# Patient Record
Sex: FEMALE | Race: WHITE | NOT HISPANIC OR LATINO | Employment: FULL TIME | URBAN - METROPOLITAN AREA
[De-identification: names, ages, dates, MRNs, and addresses within clinical notes are randomized per-mention and may not be internally consistent; named-entity substitution may affect disease eponyms.]

---

## 2022-06-08 ENCOUNTER — HOSPITAL ENCOUNTER (EMERGENCY)
Facility: HOSPITAL | Age: 82
Discharge: HOME | End: 2022-06-08
Attending: EMERGENCY MEDICINE
Payer: MEDICARE

## 2022-06-08 VITALS
DIASTOLIC BLOOD PRESSURE: 87 MMHG | BODY MASS INDEX: 23.56 KG/M2 | HEIGHT: 64 IN | HEART RATE: 69 BPM | TEMPERATURE: 98 F | WEIGHT: 138 LBS | RESPIRATION RATE: 18 BRPM | SYSTOLIC BLOOD PRESSURE: 135 MMHG | OXYGEN SATURATION: 97 %

## 2022-06-08 DIAGNOSIS — R91.1 RIGHT LOWER LOBE PULMONARY NODULE: ICD-10-CM

## 2022-06-08 DIAGNOSIS — S22.060A COMPRESSION FRACTURE OF T7 VERTEBRA, INITIAL ENCOUNTER: Primary | ICD-10-CM

## 2022-06-08 DIAGNOSIS — R06.00 DYSPNEA, UNSPECIFIED TYPE: ICD-10-CM

## 2022-06-08 LAB
ALBUMIN SERPL-MCNC: 3.6 GM/DL (ref 3.4–4.8)
ALBUMIN/GLOB SERPL: 1.3 RATIO (ref 1.1–2)
ALP SERPL-CCNC: 90 UNIT/L (ref 40–150)
ALT SERPL-CCNC: 16 UNIT/L (ref 0–55)
APPEARANCE UR: CLEAR
AST SERPL-CCNC: 20 UNIT/L (ref 5–34)
BACTERIA #/AREA URNS AUTO: ABNORMAL /HPF
BASOPHILS # BLD AUTO: 0.05 X10(3)/MCL (ref 0–0.2)
BASOPHILS NFR BLD AUTO: 0.8 %
BILIRUB UR QL STRIP.AUTO: NEGATIVE MG/DL
BILIRUBIN DIRECT+TOT PNL SERPL-MCNC: 0.4 MG/DL
BNP BLD-MCNC: 14.9 PG/ML
BUN SERPL-MCNC: 20.6 MG/DL (ref 9.8–20.1)
CALCIUM SERPL-MCNC: 9.5 MG/DL (ref 8.4–10.2)
CHLORIDE SERPL-SCNC: 106 MMOL/L (ref 98–107)
CO2 SERPL-SCNC: 26 MMOL/L (ref 23–31)
COLOR UR AUTO: YELLOW
CREAT SERPL-MCNC: 0.88 MG/DL (ref 0.55–1.02)
D DIMER PPP IA.FEU-MCNC: 0.45 UG/ML FEU (ref 0–0.5)
EOSINOPHIL # BLD AUTO: 0.56 X10(3)/MCL (ref 0–0.9)
EOSINOPHIL NFR BLD AUTO: 9.2 %
ERYTHROCYTE [DISTWIDTH] IN BLOOD BY AUTOMATED COUNT: 13.9 % (ref 11.5–17)
GLOBULIN SER-MCNC: 2.8 GM/DL (ref 2.4–3.5)
GLUCOSE SERPL-MCNC: 121 MG/DL (ref 82–115)
GLUCOSE UR QL STRIP.AUTO: NEGATIVE MG/DL
HCT VFR BLD AUTO: 40.9 % (ref 37–47)
HGB BLD-MCNC: 13.5 GM/DL (ref 12–16)
IMM GRANULOCYTES # BLD AUTO: 0.03 X10(3)/MCL (ref 0–0.02)
IMM GRANULOCYTES NFR BLD AUTO: 0.5 % (ref 0–0.43)
KETONES UR QL STRIP.AUTO: NEGATIVE MG/DL
LEUKOCYTE ESTERASE UR QL STRIP.AUTO: ABNORMAL UNIT/L
LYMPHOCYTES # BLD AUTO: 1.74 X10(3)/MCL (ref 0.6–4.6)
LYMPHOCYTES NFR BLD AUTO: 28.6 %
MCH RBC QN AUTO: 30.9 PG (ref 27–31)
MCHC RBC AUTO-ENTMCNC: 33 MG/DL (ref 33–36)
MCV RBC AUTO: 93.6 FL (ref 80–94)
MONOCYTES # BLD AUTO: 0.41 X10(3)/MCL (ref 0.1–1.3)
MONOCYTES NFR BLD AUTO: 6.7 %
NEUTROPHILS # BLD AUTO: 3.3 X10(3)/MCL (ref 2.1–9.2)
NEUTROPHILS NFR BLD AUTO: 54.2 %
NITRITE UR QL STRIP.AUTO: NEGATIVE
NRBC BLD AUTO-RTO: 0 %
PH UR STRIP.AUTO: 5.5 [PH]
PLATELET # BLD AUTO: 293 X10(3)/MCL (ref 130–400)
PMV BLD AUTO: 9.6 FL (ref 9.4–12.4)
POTASSIUM SERPL-SCNC: 4 MMOL/L (ref 3.5–5.1)
PROT SERPL-MCNC: 6.4 GM/DL (ref 5.8–7.6)
PROT UR QL STRIP.AUTO: NEGATIVE MG/DL
RBC # BLD AUTO: 4.37 X10(6)/MCL (ref 4.2–5.4)
RBC #/AREA URNS AUTO: ABNORMAL /HPF
RBC UR QL AUTO: NEGATIVE UNIT/L
SODIUM SERPL-SCNC: 143 MMOL/L (ref 136–145)
SP GR UR STRIP.AUTO: 1.02
SQUAMOUS #/AREA URNS AUTO: ABNORMAL /LPF
TROPONIN I SERPL-MCNC: <0.01 NG/ML (ref 0–0.04)
UROBILINOGEN UR STRIP-ACNC: 0.2 MG/DL
WBC # SPEC AUTO: 6.1 X10(3)/MCL (ref 4.5–11.5)
WBC #/AREA URNS AUTO: ABNORMAL /HPF

## 2022-06-08 PROCEDURE — 81001 URINALYSIS AUTO W/SCOPE: CPT | Performed by: EMERGENCY MEDICINE

## 2022-06-08 PROCEDURE — 99285 EMERGENCY DEPT VISIT HI MDM: CPT | Mod: 25

## 2022-06-08 PROCEDURE — 85025 COMPLETE CBC W/AUTO DIFF WBC: CPT | Performed by: EMERGENCY MEDICINE

## 2022-06-08 PROCEDURE — 84075 ASSAY ALKALINE PHOSPHATASE: CPT | Performed by: EMERGENCY MEDICINE

## 2022-06-08 PROCEDURE — 85379 FIBRIN DEGRADATION QUANT: CPT | Performed by: EMERGENCY MEDICINE

## 2022-06-08 PROCEDURE — 80053 COMPREHEN METABOLIC PANEL: CPT | Performed by: EMERGENCY MEDICINE

## 2022-06-08 PROCEDURE — 36415 COLL VENOUS BLD VENIPUNCTURE: CPT | Performed by: EMERGENCY MEDICINE

## 2022-06-08 PROCEDURE — 84484 ASSAY OF TROPONIN QUANT: CPT | Performed by: EMERGENCY MEDICINE

## 2022-06-08 PROCEDURE — 83880 ASSAY OF NATRIURETIC PEPTIDE: CPT | Performed by: EMERGENCY MEDICINE

## 2022-06-08 RX ORDER — OLMESARTAN MEDOXOMIL AND HYDROCHLOROTHIAZIDE 20/12.5 20; 12.5 MG/1; MG/1
1 TABLET ORAL DAILY
COMMUNITY
Start: 2022-05-02

## 2022-06-08 RX ORDER — AMLODIPINE BESYLATE 5 MG/1
5 TABLET ORAL DAILY
COMMUNITY
Start: 2022-05-02

## 2022-06-08 RX ORDER — PRAVASTATIN SODIUM 40 MG/1
40 TABLET ORAL DAILY
COMMUNITY
Start: 2022-05-02

## 2022-06-08 NOTE — ED NOTES
Pt states she fell and hurt her  posterior mid to upper back and right shoulder one month ago, pt states she has a hx of left shoulder sx and is c/o right shoulder that has been hurting ever since her fall a month ago pt rates the pain as 5/10. Pt states her problems with sob started after her fall. Pt denies chest pain or dizziness, respirations 21 02sat 95% on RA

## 2022-06-08 NOTE — ED PROVIDER NOTES
Encounter Date: 6/8/2022       History     Chief Complaint   Patient presents with    Shortness of Breath     Pt states she fell a month ago, pt states she has had upper posterior back pain, and ever since she feels like she has been having problems breathing. Pt denies chest pain and dizziness.     81-year-old female states that she fell about a month ago and has been having upper back pain and shortness of breath that is getting progressively worse over the last month.  She had an x-ray at that time which was negative.  She saw her orthopedic doctor and had blood in her right shoulder joint which was aspirated and she was treated with a steroid shot there.  She has no chest pain.  She states she has a history HTN, HLD, of CAD, no stents or surgeries.  Over the last week she went on a motorcycle trip and traveled 1100 miles on a bike and the bouncing on the bite seen to have made her back pain and shortness of breath much worse.      Shortness of Breath  This is a new problem. The average episode lasts 4 weeks. The problem occurs continuously.The problem has not changed since onset.Associated symptoms comments: Back pain.     Review of patient's allergies indicates:   Allergen Reactions    Penicillins Rash     No past medical history on file.  No past surgical history on file.  No family history on file.     Review of Systems   Respiratory: Positive for shortness of breath.    Musculoskeletal: Positive for back pain.   All other systems reviewed and are negative.      Physical Exam     Initial Vitals [06/08/22 1005]   BP Pulse Resp Temp SpO2   132/80 80 (!) 22 97.7 °F (36.5 °C) 96 %      MAP       --         Physical Exam    Nursing note and vitals reviewed.  Constitutional: She appears well-developed and well-nourished. She is not diaphoretic. No distress.   HENT:   Head: Normocephalic and atraumatic.   Mouth/Throat: Oropharynx is clear and moist.   Eyes: Conjunctivae are normal. Pupils are equal, round, and  reactive to light.   Neck: Neck supple.   Cardiovascular: Normal rate, regular rhythm, normal heart sounds and intact distal pulses.   Pulmonary/Chest: Breath sounds normal. No respiratory distress. She has no wheezes. She has no rhonchi. She has no rales.   Abdominal: Abdomen is soft. Bowel sounds are normal. She exhibits no distension. There is no abdominal tenderness. There is no guarding.   Musculoskeletal:         General: No tenderness or edema. Normal range of motion.      Cervical back: Neck supple.     Neurological: She is alert and oriented to person, place, and time.   Skin: Skin is warm and dry. Capillary refill takes less than 2 seconds. No rash noted.   Psychiatric: She has a normal mood and affect. Thought content normal.         ED Course   Procedures  Labs Reviewed   CBC WITH DIFFERENTIAL - Abnormal; Notable for the following components:       Result Value    IG# 0.03 (*)     IG% 0.5 (*)     All other components within normal limits   COMPREHENSIVE METABOLIC PANEL - Abnormal; Notable for the following components:    Glucose Level 121 (*)     Blood Urea Nitrogen 20.6 (*)     All other components within normal limits   URINALYSIS, REFLEX TO URINE CULTURE - Abnormal; Notable for the following components:    Leukocyte Esterase, UA Trace (*)     All other components within normal limits   URINALYSIS, MICROSCOPIC - Abnormal; Notable for the following components:    Squamous Epithelial Cells, UA Few (*)     All other components within normal limits   D DIMER, QUANTITATIVE - Normal   TROPONIN I - Normal   B-TYPE NATRIURETIC PEPTIDE - Normal     EKG Readings: (Independently Interpreted)   Initial Reading: No STEMI. Rhythm: Normal Sinus Rhythm. Heart Rate: 77. Ectopy: No Ectopy. ST Segments: Normal ST Segments. T Waves: Normal.       Imaging Results          CT Chest Without Contrast (Final result)  Result time 06/08/22 12:27:08    Final result by Prachi Santana MD (06/08/22 12:27:08)                  Impression:      1. No acute abnormality of the chest.  2. Emphysematous and chronic interstitial changes of the lungs.  3. A 5 mm right lower lobe pulmonary nodule can be followed with optional CT chest in 12 months.  4. Subacute appearing T7 compression fracture with mild loss of height.      Electronically signed by: Prachi Santana  Date:    06/08/2022  Time:    12:27             Narrative:    EXAMINATION:  CT CHEST WITHOUT CONTRAST    CLINICAL HISTORY:  Shortness of breath (Ped 0-18y);    TECHNIQUE:  CT imaging of the chest without IV contrast. Axial, coronal and sagittal reformatted images are reviewed. Dose length product is 299 mGycm. Automatic exposure control, adjustment of mA/kV or iterative reconstruction technique was used to limit radiation dose.    COMPARISON:  None    FINDINGS:  Lungs and large airways: There are mild emphysematous changes of the lungs.  There are also mild chronic interstitial changes with peripheral reticulations and mild bronchiectasis in the lung bases.  There is a 5 mm right lower lobe pulmonary nodule (series 8, image 41).  There is no focal airspace consolidation.    Pleura: No pleural effusion.    Heart and vasculature: Heart size is normal. No pericardial effusion.  Scattered coronary artery calcifications are noted.    Mediastinum and mikel: The lack of IV contrast decreases sensitivity, but no pathologically enlarged lymph node is identified.    Chest wall/axilla and lower neck: No significant findings.    Upper abdomen: 2 hepatic hypodensities are incompletely evaluated but most likely represent cysts.    Bones: There is a subacute appearing superior endplate compression deformity at T7 with mild loss of height.                                 Medications - No data to display  Medical Decision Making:   Initial Assessment:   81-year-old female with mid back pain since falling a week ago associated with shortness of breath is worse with activity, relieved by  rest.  Differential Diagnosis:   PE, T-spine fracture, pulmonary contusion, CHF, pulmonary edema  ED Management:  Patient did not want any pain medications stating that pain medicine does not agree with her.  She will follow-up with her cardiologist and primary care provider.  This T-spine fractures been present now for a month and there is no surgical intervention that is indicated at this time.  81-year-old female who fell last month and has mid back pain consistent with the T7 subacute compression fracture seen on CT scan.  She has shortness of breath with any activity and worsening back pain with activity.  There is no sign of congestive heart failure or acute coronary syndrome.  Her lungs are clear and D-dimer is negative.  She states she had an echocardiogram with her cardiologist a couple of weeks ago and it was normal.  I suspect the shortness of breath is due to the pain from her T7 fracture but I am recommending she follow-up with her cardiologist and with her primary care provider for further evaluation.             ED Course as of 06/08/22 1742 Wed Jun 08, 2022   1140 Brain natriuretic peptide [SH]      ED Course User Index  [SH] Soraya Zhang MD             Clinical Impression:   Final diagnoses:  [S22.060A] Compression fracture of T7 vertebra, initial encounter (Primary)  [R06.00] Dyspnea, unspecified type  [R91.1] Right lower lobe pulmonary nodule          ED Disposition Condition    Discharge Stable        ED Prescriptions     None        Follow-up Information     Follow up With Specialties Details Why Contact Info    PCP and Cardiologist  Schedule an appointment as soon as possible for a visit in 1 week             Soraya Zhang MD  06/08/22 [id number]

## 2022-06-30 DIAGNOSIS — R06.09 PLATYPNEA-ORTHODEOXIA SYNDROME: Primary | ICD-10-CM

## 2022-07-28 ENCOUNTER — PROCEDURE VISIT (OUTPATIENT)
Dept: RESPIRATORY THERAPY | Facility: HOSPITAL | Age: 82
End: 2022-07-28
Attending: INTERNAL MEDICINE
Payer: MEDICARE

## 2022-07-28 DIAGNOSIS — R06.09 PLATYPNEA-ORTHODEOXIA SYNDROME: ICD-10-CM

## 2022-07-28 LAB
DLCO ADJ PRE: 12.42 ML/(MIN*MMHG) (ref 12.04–23.51)
DLCO SINGLE BREATH LLN: 12.04
DLCO SINGLE BREATH PRE REF: 69.9 %
DLCO SINGLE BREATH REF: 17.78
DLCOC SBVA LLN: 2.4
DLCOC SBVA PRE REF: 121.6 %
DLCOC SBVA REF: 3.95
DLCOC SINGLE BREATH LLN: 12.04
DLCOC SINGLE BREATH PRE REF: 69.9 %
DLCOC SINGLE BREATH REF: 17.78
DLCOCSBVAULN: 5.49
DLCOCSINGLEBREATHULN: 23.51
DLCOSINGLEBREATHULN: 23.51
DLCOVA LLN: 2.4
DLCOVA PRE REF: 121.6 %
DLCOVA PRE: 4.8 ML/(MIN*MMHG*L) (ref 2.4–5.49)
DLCOVA REF: 3.95
DLCOVAULN: 5.49
DLVAADJ PRE: 4.8 ML/(MIN*MMHG*L) (ref 2.4–5.49)
ERV LLN: -16449.57
ERV PRE REF: 58.2 %
ERV REF: 0.43
ERVULN: ABNORMAL
FEF 25 75 LLN: 0.55
FEF 25 75 PRE REF: 89.1 %
FEF 25 75 REF: 1.37
FEV05 LLN: 0.54
FEV05 REF: 1.4
FEV1 FVC LLN: 62
FEV1 FVC PRE REF: 106.7 %
FEV1 FVC REF: 77
FEV1 LLN: 1.16
FEV1 PRE REF: 81.9 %
FEV1 REF: 1.68
FRCPLETH LLN: 1.75
FRCPLETH PREREF: 58.2 %
FRCPLETH REF: 2.58
FRCPLETHULN: 3.4
FVC LLN: 1.53
FVC PRE REF: 75.7 %
FVC REF: 2.22
IVC PRE: 1.53 L (ref 1.53–2.91)
IVC SINGLE BREATH LLN: 1.53
IVC SINGLE BREATH PRE REF: 69.2 %
IVC SINGLE BREATH REF: 2.22
IVCSINGLEBREATHULN: 2.91
LLN IC: -16448.43
MVV LLN: 51
MVV PRE REF: 127.8 %
MVV REF: 59
PEF LLN: 2.53
PEF PRE REF: 109.9 %
PEF REF: 4.11
PRE DLCO: 12.42 ML/(MIN*MMHG) (ref 12.04–23.51)
PRE ERV: 0.25 L (ref -16449.57–16450.43)
PRE FEF 25 75: 1.22 L/S (ref 0.55–2.19)
PRE FET 100: 7.1 SEC
PRE FEV05 REF: 81.5 %
PRE FEV1 FVC: 82.07 % (ref 61.69–92.19)
PRE FEV1: 1.38 L (ref 1.16–2.21)
PRE FEV5: 1.14 L (ref 0.54–2.25)
PRE FRC PL: 1.5 L
PRE FVC: 1.68 L (ref 1.53–2.91)
PRE IC: 1.84 L (ref -16448.43–16451.57)
PRE MVV: 76 L/MIN (ref 50.56–68.4)
PRE PEF: 4.51 L/S (ref 2.53–5.68)
PRE REF IC: 116.8 %
PRE RV: 1.24 L (ref 1.58–2.73)
PRE TLC: 3.34 L (ref 3.52–5.49)
PRE VTG: 2.06 L
RAW PRE REF: 121.8 %
RAW PRE: 3.73 CMH2O*S/L (ref 3.06–3.06)
RAW REF: 3.06
REF IC: 1.57
RV LLN: 1.58
RV PRE REF: 57.5 %
RV REF: 2.15
RVTLC LLN: 38
RVTLC PRE REF: 78.6 %
RVTLC PRE: 37.08 % (ref 37.59–56.77)
RVTLC REF: 47
RVTLCULN: 57
RVULN: 2.73
SGAW PRE REF: 175.4 %
SGAW PRE: 0.18 1/(CMH2O*S) (ref 0.1–0.1)
SGAW REF: 0.1
TLC LLN: 3.52
TLC PRE REF: 74 %
TLC REF: 4.51
TLC ULN: 5.49
ULN IC: ABNORMAL
VA PRE: 2.59 L (ref 4.36–4.36)
VA SINGLE BREATH LLN: 4.36
VA SINGLE BREATH PRE REF: 59.5 %
VA SINGLE BREATH REF: 4.36
VASINGLEBREATHULN: 4.36
VC LLN: 1.53
VC PRE REF: 94.6 %
VC PRE: 2.1 L (ref 1.53–2.91)
VC REF: 2.22
VC ULN: 2.91

## 2022-07-28 PROCEDURE — 94727 GAS DIL/WSHOT DETER LNG VOL: CPT

## 2022-07-28 PROCEDURE — 94729 DIFFUSING CAPACITY: CPT

## 2022-07-28 PROCEDURE — 94010 BREATHING CAPACITY TEST: CPT

## 2025-01-08 NOTE — DISCHARGE INSTRUCTIONS
Follow-up with your cardiologist and primary care provider provided regarding the shortness of breath and T7 fracture.  We also discussed the 5mm right lower lobe pulmonary nodule and you will need a repeat CT scan of that next year to make sure it is not enlarging.  
62